# Patient Record
Sex: MALE
[De-identification: names, ages, dates, MRNs, and addresses within clinical notes are randomized per-mention and may not be internally consistent; named-entity substitution may affect disease eponyms.]

---

## 2022-04-17 ENCOUNTER — FORM ENCOUNTER (OUTPATIENT)
Age: 52
End: 2022-04-17

## 2022-04-25 PROBLEM — Z00.00 ENCOUNTER FOR PREVENTIVE HEALTH EXAMINATION: Status: ACTIVE | Noted: 2022-04-25

## 2022-04-29 ENCOUNTER — APPOINTMENT (OUTPATIENT)
Dept: SLEEP CENTER | Facility: CLINIC | Age: 52
End: 2022-04-29

## 2022-05-04 ENCOUNTER — FORM ENCOUNTER (OUTPATIENT)
Age: 52
End: 2022-05-04

## 2022-08-10 ENCOUNTER — APPOINTMENT (OUTPATIENT)
Dept: PULMONOLOGY | Facility: CLINIC | Age: 52
End: 2022-08-10

## 2022-08-10 VITALS
DIASTOLIC BLOOD PRESSURE: 85 MMHG | SYSTOLIC BLOOD PRESSURE: 139 MMHG | HEART RATE: 64 BPM | HEIGHT: 66 IN | TEMPERATURE: 98 F | RESPIRATION RATE: 17 BRPM | WEIGHT: 192 LBS | OXYGEN SATURATION: 97 % | BODY MASS INDEX: 30.86 KG/M2

## 2022-08-10 DIAGNOSIS — G47.33 OBSTRUCTIVE SLEEP APNEA (ADULT) (PEDIATRIC): ICD-10-CM

## 2022-08-10 DIAGNOSIS — G47.39 OTHER SLEEP APNEA: ICD-10-CM

## 2022-08-10 DIAGNOSIS — G47.30 SLEEP APNEA, UNSPECIFIED: ICD-10-CM

## 2022-08-10 PROCEDURE — 99204 OFFICE O/P NEW MOD 45 MIN: CPT | Mod: GC

## 2022-08-10 NOTE — PHYSICAL EXAM
[General Appearance - Well Developed] : well developed [General Appearance - Well Nourished] : well nourished [Normal Conjunctiva] : the conjunctiva exhibited no abnormalities [Neck Appearance] : the appearance of the neck was normal [Thyroid Diffuse Enlargement] : the thyroid was not enlarged [Apical Impulse] : the apical impulse was normal [Heart Sounds] : normal S1 and S2 [Respiration, Rhythm And Depth] : normal respiratory rhythm and effort [Auscultation Breath Sounds / Voice Sounds] : lungs were clear to auscultation bilaterally [Chest Palpation] : palpation of the chest revealed no abnormalities [Lungs Percussion] : the lungs were normal to percussion [Abnormal Walk] : normal gait [Nail Clubbing] : no clubbing  or cyanosis of the fingernails [Musculoskeletal - Swelling] : no joint swelling seen [Motor Tone] : muscle strength and tone were normal [Skin Turgor] : normal skin turgor [] : no rash [Cranial Nerves] : cranial nerves 2-12 were intact [Deep Tendon Reflexes (DTR)] : deep tendon reflexes were 2+ and symmetric [Oriented To Time, Place, And Person] : oriented to person, place, and time [Impaired Insight] : insight and judgment were intact [Affect] : the affect was normal [Memory Recent] : recent memory was not impaired [Low Lying Soft Palate] : low lying soft palate

## 2022-08-11 PROBLEM — G47.33 OBSTRUCTIVE SLEEP APNEA, ADULT: Status: ACTIVE | Noted: 2022-08-11

## 2022-08-11 NOTE — ASSESSMENT
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  He stated that about 2 years ago he went to bed with normal vision and woke up that morning having lost vision in the lower half of his right eye.  He states that he is seeing an eye doctor who believes that this may be related to sleep apnea and so was referred for sleep evaluation.  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  Dx and treatment of raheem if present is important to reduce risks of further occular events. 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  Plan will be for home diagnostic sleep study for further evaluation (ordered today, 8/10/22).  Will plan to follow up with patient following home sleep study. \par \par Patient seen and discussed w/Dr. Mendoza\par \par Soy Manriquez PGY5\par [id number]\par \par

## 2022-08-11 NOTE — HISTORY OF PRESENT ILLNESS
[Snoring] : snoring [Frequent Nocturnal Awakening] : frequent nocturnal awakening [Awakening With Dry Mouth] : awakening with dry mouth [FreeTextEntry1] : Chidi Bhakta is a 52 year old with diabetes, hypertension, hyperlipidemia, "mini-stroke",  who presents 8/10/22 for initial evaluation.  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  He stated that about 2 years ago he went to bed with normal vision and woke up that morning having lost vision in the lower half of his right eye.  He states that he is seeing an eye doctor who believes that this may be related to sleep apnea and so was referred for sleep evaluation.  \par \par He states that he has been told that he snores in the past.  He wakes up with dry mouth.  He sleeps by himself and does not know if he chokes or gasps in his sleep.  He denies feeling tired in the morning when he wakes up.  He goes to bed approximately at 8 pm and wakes up around 3:30 am.  He usually sleeps 6-7 hours a night.  He wakes up around 3-4 times a night to use the restroom and thinks that overall this has been increasing in frequency.  He does not have difficulty falling asleep but is unsure how long it takes him to fall asleep.  He does not feel tired during the day and does not take naps during the day.  He does not have headaches in the morning.  He denies any significant changes in his weight over the last 5 years.  He denies hallucinations upon waking or going to bed or sleep paralysis.  He denies using medications to help him fall asleep.  He denies cigarette use or alcohol use prior to sleep.  \par \par He works as a .\par \par He sees Dr. Dominick Bunn at [facility], [address] [facility] [phone].  \par \par \par  [Witnessed Apneas] : no witnessed sleep apnea [Awakes Unrefreshed] : does not awake unrefreshed [Awakes with Headache] : no headache upon awakening [Daytime Somnolence] : no daytime somnolence [Hypersomnolence] : no hypersomnolence [Sleep Paralysis] : no sleep paralysis [Hypnagogic Hallucinations] : no hallucinations when falling asleep [Hypnopompic Hallucinations] : no hallucinations when awakening [ESS] : 7